# Patient Record
Sex: MALE
[De-identification: names, ages, dates, MRNs, and addresses within clinical notes are randomized per-mention and may not be internally consistent; named-entity substitution may affect disease eponyms.]

---

## 2019-09-12 NOTE — CM/SW NOTE
Spoke with patient regarding transportation home post ER discharge. Patient lives in [facility], and his grandmother lives in [place].   Patient requested to get taxi to [facility] stated that he will need to get a family member or friend to pi

## 2019-09-12 NOTE — ED NOTES
Spoke w [id number] who karen gave his 'mothers' phone number for and she refuses to pick him up. Also spoke with his grandmother who also refuses to pick him up. Case management setting up a ride to his mothers.

## 2019-09-12 NOTE — ED INITIAL ASSESSMENT (HPI)
Found sleeping on random persons yard + [address]. Pt aggressive and states \"YOU CANT KEEP ME HERE IM READY TO GO'. refusign ton answer questions. Explained POC and pt is refusing to cooperate. Public safety called.

## 2019-09-18 ENCOUNTER — HOSPITAL (OUTPATIENT)
Dept: OTHER | Age: 29
End: 2019-09-18

## 2019-09-18 LAB
ALBUMIN SERPL-MCNC: 4.4 G/DL (ref 3.6–5.1)
ALP SERPL-CCNC: 73 UNITS/L (ref 45–117)
ALT SERPL-CCNC: 31 UNITS/L
AMPHETAMINES UR QL SCN>500 NG/ML: NEGATIVE
AMPHETAMINES UR QL SCN>500 NG/ML: NORMAL
ANALYZER ANC (IANC): ABNORMAL
ANION GAP SERPL CALC-SCNC: 10 MMOL/L (ref 10–20)
AST SERPL-CCNC: 31 UNITS/L
BARBITURATES UR QL SCN>200 NG/ML: NEGATIVE
BARBITURATES UR QL SCN>200 NG/ML: NORMAL
BASOPHILS # BLD: 0.1 K/MCL (ref 0–0.3)
BASOPHILS NFR BLD: 1 %
BENZODIAZ UR QL SCN>200 NG/ML: NEGATIVE
BENZODIAZ UR QL SCN>200 NG/ML: NORMAL
BILIRUB CONJ SERPL-MCNC: 0.2 MG/DL (ref 0–0.2)
BILIRUB SERPL-MCNC: 0.9 MG/DL (ref 0.2–1)
BUN SERPL-MCNC: 16 MG/DL (ref 6–20)
BUN/CREAT SERPL: 18 (ref 7–25)
BZE UR QL SCN>150 NG/ML: NEGATIVE
BZE UR QL SCN>150 NG/ML: NORMAL
CALCIUM SERPL-MCNC: 9.2 MG/DL (ref 8.4–10.2)
CANNABINOIDS UR QL SCN>50 NG/ML: NEGATIVE
CANNABINOIDS UR QL SCN>50 NG/ML: NORMAL
CHLORIDE SERPL-SCNC: 105 MMOL/L (ref 98–107)
CO2 SERPL-SCNC: 28 MMOL/L (ref 21–32)
CREAT SERPL-MCNC: 0.91 MG/DL (ref 0.67–1.17)
DIFFERENTIAL METHOD BLD: ABNORMAL
EOSINOPHIL # BLD: 0 K/MCL (ref 0.1–0.5)
EOSINOPHIL NFR BLD: 0 %
ERYTHROCYTE [DISTWIDTH] IN BLOOD: 13 % (ref 11–15)
ETHANOL SERPL-MCNC: 78 MG/DL
GGT SERPL-CCNC: 34 UNITS/L (ref 15–85)
GLUCOSE SERPL-MCNC: 85 MG/DL (ref 65–99)
HCT VFR BLD CALC: 45.3 % (ref 39–51)
HGB BLD-MCNC: 15.6 G/DL (ref 13–17)
IMM GRANULOCYTES # BLD AUTO: 0 K/MCL (ref 0–0.2)
IMM GRANULOCYTES NFR BLD: 0 %
INR PPP: 1.1
INR PPP: NORMAL
LYMPHOCYTES # BLD: 1.2 K/MCL (ref 1–4.8)
LYMPHOCYTES NFR BLD: 14 %
MAGNESIUM SERPL-MCNC: 2.3 MG/DL (ref 1.7–2.4)
MCH RBC QN AUTO: 32.7 PG (ref 26–34)
MCHC RBC AUTO-ENTMCNC: 34.4 G/DL (ref 32–36.5)
MCV RBC AUTO: 95 FL (ref 78–100)
METHADONE UR QL SCN>300 NG/ML: NEGATIVE NG/ML
METHADONE UR QL SCN>300 NG/ML: NORMAL
MONOCYTES # BLD: 0.5 K/MCL (ref 0.3–0.9)
MONOCYTES NFR BLD: 6 %
NEUTROPHILS # BLD: 6.9 K/MCL (ref 1.8–7.7)
NEUTROPHILS NFR BLD: 79 %
NEUTS SEG NFR BLD: ABNORMAL %
NRBC (NRBCRE): 0 /100 WBC
OPIATES UR QL SCN>300 NG/ML: NEGATIVE
OPIATES UR QL SCN>300 NG/ML: NORMAL
PCP UR QL SCN>25 NG/ML: NEGATIVE
PCP UR QL SCN>25 NG/ML: NORMAL
PLATELET # BLD: 231 K/MCL (ref 140–450)
POTASSIUM SERPL-SCNC: 3.8 MMOL/L (ref 3.4–5.1)
PROT SERPL-MCNC: 7.5 G/DL (ref 6.4–8.2)
PROTHROMBIN TIME (PRT2): NORMAL
PROTHROMBIN TIME: 11.1 SEC (ref 9.7–11.8)
RBC # BLD: 4.77 MIL/MCL (ref 4.5–5.9)
SODIUM SERPL-SCNC: 139 MMOL/L (ref 135–145)
WBC # BLD: 8.7 K/MCL (ref 4.2–11)

## 2019-09-18 PROCEDURE — 99253 IP/OBS CNSLTJ NEW/EST LOW 45: CPT | Performed by: INTERNAL MEDICINE

## 2019-09-18 PROCEDURE — 99285 EMERGENCY DEPT VISIT HI MDM: CPT | Performed by: EMERGENCY MEDICINE

## 2019-09-19 LAB
25(OH)D3+25(OH)D2 SERPL-MCNC: 29.3 NG/ML (ref 30–100)
25(OH)D3+25(OH)D2 SERPL-MCNC: ABNORMAL NG/ML
CHOLEST SERPL-MCNC: 149 MG/DL
CHOLEST SERPL-MCNC: NORMAL MG/DL
CHOLEST/HDLC SERPL: 2.2 {RATIO}
HDLC SERPL-MCNC: 67 MG/DL
HDLC SERPL-MCNC: NORMAL MG/DL
LDLC SERPL CALC-MCNC: 65 MG/DL
LDLC SERPL CALC-MCNC: NORMAL MG/DL
NONHDLC SERPL-MCNC: 82 MG/DL
NONHDLC SERPL-MCNC: NORMAL MG/DL
TRIGL SERPL-MCNC: 85 MG/DL
TRIGL SERPL-MCNC: NORMAL MG/DL
TSH SERPL-ACNC: 1.37 MCUNITS/ML (ref 0.35–5)

## 2019-09-19 PROCEDURE — 90792 PSYCH DIAG EVAL W/MED SRVCS: CPT | Performed by: PSYCHIATRY & NEUROLOGY

## 2019-09-20 PROCEDURE — 99232 SBSQ HOSP IP/OBS MODERATE 35: CPT | Performed by: PSYCHIATRY & NEUROLOGY

## 2019-09-21 PROCEDURE — 99232 SBSQ HOSP IP/OBS MODERATE 35: CPT | Performed by: PSYCHIATRY & NEUROLOGY

## 2019-09-22 PROCEDURE — 99232 SBSQ HOSP IP/OBS MODERATE 35: CPT | Performed by: INTERNAL MEDICINE

## 2019-09-23 PROCEDURE — 99238 HOSP IP/OBS DSCHRG MGMT 30/<: CPT | Performed by: PSYCHIATRY & NEUROLOGY

## 2019-10-04 ENCOUNTER — OFFICE VISIT (OUTPATIENT)
Dept: INTERNAL MEDICINE CLINIC | Facility: CLINIC | Age: 29
End: 2019-10-04
Payer: MEDICAID

## 2019-10-04 VITALS
HEIGHT: 70 IN | WEIGHT: 168.88 LBS | DIASTOLIC BLOOD PRESSURE: 81 MMHG | BODY MASS INDEX: 24.18 KG/M2 | SYSTOLIC BLOOD PRESSURE: 115 MMHG | HEART RATE: 65 BPM

## 2019-10-04 DIAGNOSIS — Z00.00 ANNUAL PHYSICAL EXAM: Primary | ICD-10-CM

## 2019-10-04 DIAGNOSIS — F32.A DEPRESSION, UNSPECIFIED DEPRESSION TYPE: ICD-10-CM

## 2019-10-04 DIAGNOSIS — Z82.49 FAMILY HISTORY OF BRAIN ANEURYSM: ICD-10-CM

## 2019-10-04 DIAGNOSIS — F10.20 CHRONIC ALCOHOL DEPENDENCE, CONTINUOUS (HCC): ICD-10-CM

## 2019-10-04 PROCEDURE — 99385 PREV VISIT NEW AGE 18-39: CPT | Performed by: INTERNAL MEDICINE

## 2019-10-04 RX ORDER — VENLAFAXINE HYDROCHLORIDE 75 MG/1
75 CAPSULE, EXTENDED RELEASE ORAL DAILY
COMMUNITY

## 2019-10-04 NOTE — H&P
Lani Bianchi is a 30year old male who presents for a complete physical exam.   HPI:   He has not had a physical in several years.   He requests a referral to see a Psychiatrist.  He was recently hospitalized at [facility] for 5 days in Depression       Past Surgical History:   Procedure Laterality Date   • TONSILLECTOMY  1997      Family History   Problem Relation Age of Onset   • Colon Cancer Maternal Grandmother    • Other (AAA) Paternal Grandfather    • Other (Brain Aneurysm) Paternal obtain records from [facility] if possible. Refer to Psychiatry. Contact information provided. 2. Chronic alcohol dependence, continuous (HCC)  Ongoing abstinence encouraged    3.  Depression, unspecified depression type  Follow-up with

## 2019-10-09 ENCOUNTER — HOSPITAL ENCOUNTER (OUTPATIENT)
Dept: MRI IMAGING | Facility: HOSPITAL | Age: 29
Discharge: HOME | End: 2019-10-09
Attending: INTERNAL MEDICINE
Payer: MEDICAID

## 2019-10-09 DIAGNOSIS — Z82.49 FAMILY HISTORY OF BRAIN ANEURYSM: ICD-10-CM

## 2019-10-09 PROCEDURE — 70544 MR ANGIOGRAPHY HEAD W/O DYE: CPT | Performed by: INTERNAL MEDICINE

## 2019-10-28 RX ORDER — VENLAFAXINE HYDROCHLORIDE 75 MG/1
CAPSULE, EXTENDED RELEASE ORAL
Qty: 30 CAPSULE | Refills: 0 | OUTPATIENT
Start: 2019-10-28

## 2023-05-28 NOTE — ED PROVIDER NOTES
Patient Seen in: [facility] Emergency Department    History   Patient presents with:  Alcohol Intoxication (neurologic)    Stated Complaint: etoh    HPI    Patient is a 28-year-old male brought to the emergency department after being picked up bec This patient has been assessed with a concern for Malnutrition and has been determined to have a diagnosis/diagnoses of Severe protein-calorie malnutrition.    This patient is being managed with:   Diet Clear Liquid-  Entered: May 21 2023 11:39AM   This patient has been assessed with a concern for Malnutrition and has been determined to have a diagnosis/diagnoses of Severe protein-calorie malnutrition.    This patient is being managed with:   Diet Full Liquid-  Entered: May 22 2023  9:59AM   This patient has been assessed with a concern for Malnutrition and has been determined to have a diagnosis/diagnoses of Severe protein-calorie malnutrition.    This patient is being managed with:   Diet Regular-  Entered: May 23 2023  7:21AM   This patient has been assessed with a concern for Malnutrition and has been determined to have a diagnosis/diagnoses of Severe protein-calorie malnutrition.    This patient is being managed with:   Diet Regular-  Entered: May 23 2023  7:21AM   note and vitals reviewed. ED Course   Labs Reviewed - No data to display       Patient observed in the emergency department. Given a ride to his mother's house.     MDM                 Disposition and Plan     Clinical Impression:  Alcoholic int This patient has been assessed with a concern for Malnutrition and has been determined to have a diagnosis/diagnoses of Severe protein-calorie malnutrition.    This patient is being managed with:   Diet Regular-  Entered: May 23 2023  7:21AM   This patient has been assessed with a concern for Malnutrition and has been determined to have a diagnosis/diagnoses of Severe protein-calorie malnutrition.    This patient is being managed with:   Diet Regular-  Entered: May 23 2023  7:21AM   This patient has been assessed with a concern for Malnutrition and has been determined to have a diagnosis/diagnoses of Severe protein-calorie malnutrition.    This patient is being managed with:   Diet Regular-  Low Fat (LOWFAT)  Supplement Feeding Modality:  Oral  Ensure Enlive Cans or Servings Per Day:  1       Frequency:  Two Times a day  Entered: May 26 2023  3:22PM   This patient has been assessed with a concern for Malnutrition and has been determined to have a diagnosis/diagnoses of Severe protein-calorie malnutrition.    This patient is being managed with:   Diet Clear Liquid-  Entered: May 21 2023 11:39AM   This patient has been assessed with a concern for Malnutrition and has been determined to have a diagnosis/diagnoses of Severe protein-calorie malnutrition.    This patient is being managed with:   Diet Regular-  Entered: May 23 2023  7:21AM   This patient has been assessed with a concern for Malnutrition and has been determined to have a diagnosis/diagnoses of Severe protein-calorie malnutrition.    This patient is being managed with:   Diet Regular-  Low Fat (LOWFAT)  Supplement Feeding Modality:  Oral  Ensure Enlive Cans or Servings Per Day:  1       Frequency:  Two Times a day  Entered: May 26 2023  3:22PM

## (undated) NOTE — ED AVS SNAPSHOT
Maria Del Carmen Hill   MRN: [id number]    Department:  [facility] Emergency Department   Date of Visit:  9/11/2019           Disclosure     Insurance plans vary and the physician(s) referred by the ER may not be covered by your plan.  Please contac CARE PHYSICIAN AT ONCE OR RETURN IMMEDIATELY TO THE EMERGENCY DEPARTMENT. If you have been prescribed any medication(s), please fill your prescription right away and begin taking the medication(s) as directed.   If you believe that any of the medications